# Patient Record
Sex: FEMALE | Race: WHITE | NOT HISPANIC OR LATINO
[De-identification: names, ages, dates, MRNs, and addresses within clinical notes are randomized per-mention and may not be internally consistent; named-entity substitution may affect disease eponyms.]

---

## 2023-03-24 ENCOUNTER — P2P PATIENT RECORD (OUTPATIENT)
Age: 48
End: 2023-03-24

## 2023-07-24 ENCOUNTER — OFFICE VISIT (OUTPATIENT)
Dept: URBAN - NONMETROPOLITAN AREA CLINIC 4 | Facility: CLINIC | Age: 48
End: 2023-07-24
Payer: COMMERCIAL

## 2023-07-24 ENCOUNTER — DASHBOARD ENCOUNTERS (OUTPATIENT)
Age: 48
End: 2023-07-24

## 2023-07-24 ENCOUNTER — LAB OUTSIDE AN ENCOUNTER (OUTPATIENT)
Dept: URBAN - NONMETROPOLITAN AREA CLINIC 4 | Facility: CLINIC | Age: 48
End: 2023-07-24

## 2023-07-24 VITALS
WEIGHT: 151.2 LBS | HEIGHT: 69 IN | DIASTOLIC BLOOD PRESSURE: 88 MMHG | BODY MASS INDEX: 22.39 KG/M2 | TEMPERATURE: 97.9 F | HEART RATE: 49 BPM | SYSTOLIC BLOOD PRESSURE: 141 MMHG

## 2023-07-24 DIAGNOSIS — Z85.038 HISTORY OF COLON CANCER: ICD-10-CM

## 2023-07-24 DIAGNOSIS — K59.09 CHRONIC CONSTIPATION: ICD-10-CM

## 2023-07-24 DIAGNOSIS — Z85.89 HISTORY OF KNOWN METASTASIS TO LIVER: ICD-10-CM

## 2023-07-24 PROBLEM — 429484003: Status: ACTIVE | Noted: 2023-07-24

## 2023-07-24 PROBLEM — 1098951000119108: Status: ACTIVE | Noted: 2023-07-24

## 2023-07-24 PROBLEM — 429699009: Status: ACTIVE | Noted: 2023-07-24

## 2023-07-24 PROCEDURE — 99204 OFFICE O/P NEW MOD 45 MIN: CPT | Performed by: INTERNAL MEDICINE

## 2023-07-24 RX ORDER — SOD SULF/POT CHLORIDE/MAG SULF 1.479 G
12 TABLETS THE FIRST DOSE THE EVENING BEFORE AND SECOND DOSE THE MORNING OF COLONOSCOPY TABLET ORAL TWICE A DAY
Qty: 24 | Refills: 0 | OUTPATIENT
Start: 2023-07-24

## 2023-07-24 NOTE — HPI-TODAY'S VISIT:
7/24/23: Pt is a 48 yo female with PMH of mid colon cancer s/p resection in 2015, lung and liver mets, cervical ca s/p hysterectomy in 2012 who was referred by Dr. Angelina Lopez for colonoscopy.  A copy of this document will be sent to the referring physician.  Pt was first diagnosed with colorectal cancer in 2014 with a tumor in the mid rectum.  She underwent neoadjuvant chemotherapy followed by resection in 2015.  She presented in 2017 with isolated lung and liver metastases that were later resected along with additional chemotherapy.  Her follow-up has included scans and recommendation for annual colonoscopy.  Her last colonoscopy was 2 years ago with no polyps or evidence of recurrence..   Cscope 12/13/21 by Dr. Hudson FINDINGS: -evidence of prior surgery Anastomosis and rectum approximately 8 cm from the anal verge -hemorrhoids (internal), Minimal in size -skin tags (external), Small in size -Tortuous colon  She reports feeling well and denies any abdominal pain. She reports chronic constipation. She will go a week or two without a BM. Denies rectal bleeding. Eats oatmeal and lacie seeds every morning. Takes Miralax and prune juice as needed.

## 2023-07-24 NOTE — PHYSICAL EXAM NECK/THYROID:
normal appearance , without tenderness upon palpation , no deformities , trachea midline , Thyroid normal size , no masses , thyroid nontender
Radha Manzanares MD

## 2023-08-03 ENCOUNTER — TELEPHONE ENCOUNTER (OUTPATIENT)
Dept: URBAN - NONMETROPOLITAN AREA CLINIC 4 | Facility: CLINIC | Age: 48
End: 2023-08-03

## 2023-09-01 ENCOUNTER — OFFICE VISIT (OUTPATIENT)
Dept: URBAN - METROPOLITAN AREA SURGERY CENTER 14 | Facility: SURGERY CENTER | Age: 48
End: 2023-09-01
Payer: COMMERCIAL

## 2023-09-01 DIAGNOSIS — Z85.9 HISTORY OF CANCER: ICD-10-CM

## 2023-09-01 DIAGNOSIS — Z85.038 HISTORY OF COLON CANCER: ICD-10-CM

## 2023-09-01 DIAGNOSIS — Z12.11 COLON CANCER SCREENING (HIGH RISK): ICD-10-CM

## 2023-09-01 DIAGNOSIS — Z98.0 INTESTINAL ANASTOMOSIS PRESENT: ICD-10-CM

## 2023-09-01 PROCEDURE — G0105 COLORECTAL SCRN; HI RISK IND: HCPCS | Performed by: INTERNAL MEDICINE

## 2023-09-01 PROCEDURE — G8907 PT DOC NO EVENTS ON DISCHARG: HCPCS | Performed by: INTERNAL MEDICINE

## 2023-09-01 PROCEDURE — 00812 ANES LWR INTST SCR COLSC: CPT | Performed by: NURSE ANESTHETIST, CERTIFIED REGISTERED

## 2023-12-08 ENCOUNTER — P2P PATIENT RECORD (OUTPATIENT)
Age: 48
End: 2023-12-08